# Patient Record
Sex: MALE | Race: WHITE | NOT HISPANIC OR LATINO
[De-identification: names, ages, dates, MRNs, and addresses within clinical notes are randomized per-mention and may not be internally consistent; named-entity substitution may affect disease eponyms.]

---

## 2017-06-17 ENCOUNTER — HOSPITAL (OUTPATIENT)
Dept: OTHER | Age: 37
End: 2017-06-17
Attending: EMERGENCY MEDICINE

## 2017-10-09 ENCOUNTER — NURSE ONLY (OUTPATIENT)
Dept: FAMILY MEDICINE CLINIC | Facility: CLINIC | Age: 37
End: 2017-10-09

## 2017-10-09 VITALS
HEART RATE: 100 BPM | HEIGHT: 71 IN | TEMPERATURE: 100 F | OXYGEN SATURATION: 98 % | SYSTOLIC BLOOD PRESSURE: 120 MMHG | BODY MASS INDEX: 35 KG/M2 | DIASTOLIC BLOOD PRESSURE: 80 MMHG | RESPIRATION RATE: 20 BRPM | WEIGHT: 250 LBS

## 2017-10-09 DIAGNOSIS — J40 BRONCHITIS: Primary | ICD-10-CM

## 2017-10-09 PROCEDURE — 99203 OFFICE O/P NEW LOW 30 MIN: CPT | Performed by: NURSE PRACTITIONER

## 2017-10-09 RX ORDER — BENZONATATE 200 MG/1
200 CAPSULE ORAL 3 TIMES DAILY PRN
Qty: 20 CAPSULE | Refills: 0 | Status: SHIPPED | OUTPATIENT
Start: 2017-10-09 | End: 2021-07-23

## 2017-10-09 RX ORDER — ALBUTEROL SULFATE 90 UG/1
AEROSOL, METERED RESPIRATORY (INHALATION)
Qty: 1 INHALER | Refills: 0 | Status: SHIPPED | OUTPATIENT
Start: 2017-10-09 | End: 2021-07-23

## 2017-10-09 NOTE — PROGRESS NOTES
CHIEF COMPLAINT:   Patient presents with:  Cough        HPI:   Forrest Altamirano is a 40year old male who presents for cough for  2  days. Cough started gradually and is described as dry and hacking. Patient + history of bronchitis.  Cough is nonprodu egophony. CARDIO: RRR without murmur  LYMPH: No cervical or supraclavicular lymphadenopathy. EXTREMITIES:  No clubbing, cyanosis, or edema.     ASSESSMENT AND PLAN:   Forrest Altamirano is a 40year old male who presents with: Cough    ASSESSMENT:  Br